# Patient Record
Sex: FEMALE | Race: BLACK OR AFRICAN AMERICAN | NOT HISPANIC OR LATINO | URBAN - METROPOLITAN AREA
[De-identification: names, ages, dates, MRNs, and addresses within clinical notes are randomized per-mention and may not be internally consistent; named-entity substitution may affect disease eponyms.]

---

## 2024-02-26 ENCOUNTER — APPOINTMENT (OUTPATIENT)
Dept: PRIMARY CARE | Facility: EXTERNAL LOCATION | Age: 39
End: 2024-02-26

## 2024-03-01 ENCOUNTER — OFFICE VISIT (OUTPATIENT)
Dept: PRIMARY CARE | Facility: EXTERNAL LOCATION | Age: 39
End: 2024-03-01

## 2024-03-01 VITALS
TEMPERATURE: 97.8 F | DIASTOLIC BLOOD PRESSURE: 82 MMHG | BODY MASS INDEX: 48.82 KG/M2 | HEIGHT: 65 IN | RESPIRATION RATE: 17 BRPM | OXYGEN SATURATION: 98 % | HEART RATE: 78 BPM | SYSTOLIC BLOOD PRESSURE: 120 MMHG | WEIGHT: 293 LBS

## 2024-03-01 DIAGNOSIS — Z01.84 IMMUNITY STATUS TESTING: ICD-10-CM

## 2024-03-01 DIAGNOSIS — Z11.1 TUBERCULOSIS SCREENING: ICD-10-CM

## 2024-03-01 DIAGNOSIS — Z02.0 SCHOOL HEALTH EXAMINATION: Primary | ICD-10-CM

## 2024-03-01 PROBLEM — I10 HYPERTENSION: Status: ACTIVE | Noted: 2024-03-01

## 2024-03-01 PROCEDURE — 86706 HEP B SURFACE ANTIBODY: CPT | Performed by: NURSE PRACTITIONER

## 2024-03-01 PROCEDURE — 86787 VARICELLA-ZOSTER ANTIBODY: CPT | Performed by: NURSE PRACTITIONER

## 2024-03-01 PROCEDURE — 86735 MUMPS ANTIBODY: CPT | Performed by: NURSE PRACTITIONER

## 2024-03-01 PROCEDURE — 86317 IMMUNOASSAY INFECTIOUS AGENT: CPT | Performed by: NURSE PRACTITIONER

## 2024-03-01 PROCEDURE — 86765 RUBEOLA ANTIBODY: CPT | Performed by: NURSE PRACTITIONER

## 2024-03-01 RX ORDER — AMLODIPINE BESYLATE 10 MG/1
10 TABLET ORAL DAILY
COMMUNITY
Start: 2024-02-02

## 2024-03-01 RX ORDER — LOSARTAN POTASSIUM AND HYDROCHLOROTHIAZIDE 12.5; 1 MG/1; MG/1
1 TABLET ORAL DAILY
COMMUNITY
Start: 2024-02-02

## 2024-03-01 ASSESSMENT — ENCOUNTER SYMPTOMS
CHILLS: 0
EYE DISCHARGE: 0
WEAKNESS: 0
NUMBNESS: 0
HEADACHES: 0
BACK PAIN: 0
DIZZINESS: 0
STRIDOR: 0
RHINORRHEA: 0
POLYPHAGIA: 0
SEIZURES: 0
COLOR CHANGE: 0
NAUSEA: 0
CONFUSION: 0
PHOTOPHOBIA: 0
LIGHT-HEADEDNESS: 0
EYE REDNESS: 0
ABDOMINAL PAIN: 0
DIAPHORESIS: 0
EYE ITCHING: 0
DIFFICULTY URINATING: 0
MYALGIAS: 0
ARTHRALGIAS: 0
ACTIVITY CHANGE: 0
SHORTNESS OF BREATH: 0
APPETITE CHANGE: 0
NECK STIFFNESS: 0
SPEECH DIFFICULTY: 0
UNEXPECTED WEIGHT CHANGE: 0
FATIGUE: 0
EYE PAIN: 0
DIARRHEA: 0
WHEEZING: 0
VOMITING: 0
PALPITATIONS: 0
ABDOMINAL DISTENTION: 0
COUGH: 0
VOICE CHANGE: 0
POLYDIPSIA: 0
FEVER: 0
CHEST TIGHTNESS: 0
DECREASED CONCENTRATION: 0
CONSTIPATION: 0
TROUBLE SWALLOWING: 0
NECK PAIN: 0
JOINT SWELLING: 0
TREMORS: 0

## 2024-03-01 NOTE — PROGRESS NOTES
Subjective   Patient ID: Robin Bergeron is a 38 y.o. 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    HPI:  Here for nursing school physical.     Denies chest pain, shortness of breath, musculoskeletal complaints, neuro complaints, vision changes or any symptoms that would interfere with ability to participate in nursing school, nursing clinicals or provide patient care. Has no symptoms/complaints at this time.      Patient has never been restricted/declined participation in activities from a medical standpoint.      No family history of sudden cardiac death at early age.     No Known Allergies      Current Outpatient Medications on File Prior to Visit   Medication Sig    amLODIPine (Norvasc) 10 mg tablet Take 1 tablet (10 mg) by mouth once daily.    losartan-hydrochlorothiazide (Hyzaar) 100-12.5 mg tablet Take 1 tablet by mouth once daily.     No current facility-administered medications on file prior to visit.          Past Medical History:   Diagnosis Date    Hypertension     Spontaneous vaginal delivery     x 2       History reviewed. No pertinent surgical history.    Family History   Problem Relation Name Age of Onset    Diabetes Mother      Hypertension Mother      Diabetes Father      Hypertension Father      Diabetes Maternal Grandmother      Diabetes Paternal Grandmother          Review of Systems   Constitutional:  Negative for activity change, appetite change, chills, diaphoresis, fatigue, fever and unexpected weight change.   HENT:  Negative for congestion, dental problem, hearing loss, rhinorrhea, tinnitus, trouble swallowing and voice change.    Eyes:  Negative for photophobia, pain, discharge, redness, itching and visual disturbance.   Respiratory:  Negative for cough, chest tightness, shortness of breath, wheezing and stridor.    Cardiovascular:  Negative for chest pain, palpitations and leg swelling.   Gastrointestinal:  Negative for abdominal distention, abdominal pain,  "constipation, diarrhea, nausea and vomiting.   Endocrine: Negative for cold intolerance, heat intolerance, polydipsia, polyphagia and polyuria.   Genitourinary:  Negative for difficulty urinating.   Musculoskeletal:  Negative for arthralgias, back pain, gait problem, joint swelling, myalgias, neck pain and neck stiffness.   Skin:  Negative for color change and rash.   Neurological:  Negative for dizziness, tremors, seizures, syncope, speech difficulty, weakness, light-headedness, numbness and headaches.   Psychiatric/Behavioral:  Negative for behavioral problems, confusion and decreased concentration.        Objective     Visit Vitals  /82   Pulse 78   Temp 36.6 °C (97.8 °F)   Resp 17   Ht 1.651 m (5' 5\")   Wt 138 kg (303 lb 3.2 oz)   LMP 02/23/2024   SpO2 98%   BMI 50.46 kg/m²   OB Status Having periods   Smoking Status Every Day   BSA 2.52 m²       Vision Screening    Right eye Left eye Both eyes   Without correction 20/20 20/20 20/20   With correction           Physical Exam  Constitutional:       General: She is not in acute distress.     Appearance: Normal appearance. She is not ill-appearing.   HENT:      Head: Normocephalic and atraumatic.      Right Ear: Tympanic membrane, ear canal and external ear normal.      Left Ear: Tympanic membrane, ear canal and external ear normal.      Nose: Nose normal.      Mouth/Throat:      Mouth: Mucous membranes are moist.      Pharynx: Oropharynx is clear. No oropharyngeal exudate or posterior oropharyngeal erythema.   Eyes:      Extraocular Movements: Extraocular movements intact.      Conjunctiva/sclera: Conjunctivae normal.      Pupils: Pupils are equal, round, and reactive to light.   Neck:      Comments: Mild dowagers  Cardiovascular:      Rate and Rhythm: Normal rate and regular rhythm.      Pulses: Normal pulses.      Heart sounds: Normal heart sounds. No murmur heard.  Pulmonary:      Effort: Pulmonary effort is normal. No respiratory distress.      Breath " sounds: Normal breath sounds. No wheezing, rhonchi or rales.   Abdominal:      General: Abdomen is flat. Bowel sounds are normal. There is no distension.      Palpations: Abdomen is soft.      Tenderness: There is no abdominal tenderness. There is no guarding.   Musculoskeletal:         General: No swelling, tenderness, deformity or signs of injury. Normal range of motion.      Right upper arm: Normal.      Left upper arm: Normal.      Right wrist: Normal.      Left wrist: Normal.      Right hand: Normal.      Left hand: Normal.      Cervical back: Normal, normal range of motion and neck supple. No deformity, rigidity or bony tenderness. Normal range of motion.      Thoracic back: Normal. No deformity or bony tenderness. Normal range of motion.      Lumbar back: Normal. No deformity or bony tenderness. Normal range of motion.      Right hip: Normal.      Left hip: Normal.      Right upper leg: Normal.      Left upper leg: Normal.      Right knee: Normal.      Left knee: Normal.      Right lower leg: Normal. No deformity. No edema.      Left lower leg: Normal. No deformity. No edema.      Comments: Strength normal and equal in upper and lower extremities SARA   Skin:     General: Skin is warm.      Capillary Refill: Capillary refill takes less than 2 seconds.   Neurological:      General: No focal deficit present.      Mental Status: She is alert. Mental status is at baseline.      Cranial Nerves: No cranial nerve deficit.      Sensory: No sensory deficit.      Motor: No weakness.      Coordination: Coordination normal.      Gait: Gait normal.      Deep Tendon Reflexes: Reflexes normal.   Psychiatric:         Mood and Affect: Mood normal.         Behavior: Behavior normal.         Thought Content: Thought content normal.           Assessment/Plan   Diagnoses and all orders for this visit:  School health examination  -     Hepatitis B Surface Antibody  -     Mumps Antibody, IgG  -     Rubella Antibody, IgG  -      Rubeola Antibody, IgG  -     Varicella Zoster Antibody, IgG  Immunity status testing  -     Hepatitis B Surface Antibody  -     Mumps Antibody, IgG  -     Rubella Antibody, IgG  -     Rubeola Antibody, IgG  -     Varicella Zoster Antibody, IgG    - Will need TDAP, COVID and influenza records- per school forms.   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   - Titers drawn.   - TB placed.   - Follow up with PCP for routine medical exams, preventative screenings and with development of any symptoms.

## 2024-03-02 LAB
HBV SURFACE AB SER-ACNC: 558.4 MIU/ML
MEV IGG SER QL IA: POSITIVE
MUMPS IGG ANTIBODY INDEX: 1.4 IA
MUV IGG SER IA-ACNC: POSITIVE
RUBEOLA IGG ANTIBODY INDEX: 1.3 IA
RUBV IGG SERPL IA-ACNC: 2.2 IA
RUBV IGG SERPL QL IA: POSITIVE
VARICELLA ZOSTER IGG INDEX: 4 IA
VZV IGG SER QL IA: POSITIVE

## 2024-03-04 LAB
INDURATION: 0 MM
TB SKIN TEST: NEGATIVE

## 2024-03-18 ENCOUNTER — OFFICE VISIT (OUTPATIENT)
Dept: PRIMARY CARE | Facility: EXTERNAL LOCATION | Age: 39
End: 2024-03-18

## 2024-03-18 DIAGNOSIS — Z11.1 SCREENING-PULMONARY TB: Primary | ICD-10-CM

## 2024-03-18 NOTE — PROGRESS NOTES
PPD Placement note  Robin Bergeron, 38 y.o. 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 Yes  IO: Alert and oriented in NAD.  P:  PPD placed on 3/18/2024.  Patient advised to return for reading within 48-72 hours.

## 2024-03-20 LAB
INDURATION: 0 MM
TB SKIN TEST: NEGATIVE

## 2025-02-05 ENCOUNTER — APPOINTMENT (OUTPATIENT)
Dept: PRIMARY CARE | Facility: EXTERNAL LOCATION | Age: 40
End: 2025-02-05

## 2025-02-05 VITALS
BODY MASS INDEX: 47.09 KG/M2 | WEIGHT: 293 LBS | DIASTOLIC BLOOD PRESSURE: 83 MMHG | HEIGHT: 66 IN | SYSTOLIC BLOOD PRESSURE: 130 MMHG | OXYGEN SATURATION: 98 % | RESPIRATION RATE: 17 BRPM | HEART RATE: 61 BPM | TEMPERATURE: 98.1 F

## 2025-02-05 DIAGNOSIS — Z02.0 SCHOOL HEALTH EXAMINATION: Primary | ICD-10-CM

## 2025-02-05 DIAGNOSIS — Z11.1 SCREENING-PULMONARY TB: ICD-10-CM

## 2025-02-05 ASSESSMENT — ENCOUNTER SYMPTOMS
NECK STIFFNESS: 0
HEMATURIA: 0
EYE DISCHARGE: 0
FREQUENCY: 0
BACK PAIN: 0
DIAPHORESIS: 0
NUMBNESS: 0
ACTIVITY CHANGE: 0
TROUBLE SWALLOWING: 0
EYE PAIN: 0
NAUSEA: 0
FLANK PAIN: 0
DIFFICULTY URINATING: 0
CONFUSION: 0
POLYDIPSIA: 0
POLYPHAGIA: 0
UNEXPECTED WEIGHT CHANGE: 0
CHILLS: 0
WEAKNESS: 0
DECREASED CONCENTRATION: 0
ABDOMINAL PAIN: 0
APPETITE CHANGE: 0
RHINORRHEA: 0
STRIDOR: 0
PHOTOPHOBIA: 0
WHEEZING: 0
HALLUCINATIONS: 0
DIZZINESS: 0
COUGH: 0
ARTHRALGIAS: 0
DIARRHEA: 0
FATIGUE: 0
LIGHT-HEADEDNESS: 0
MYALGIAS: 0
SHORTNESS OF BREATH: 0
SPEECH DIFFICULTY: 0
SEIZURES: 0
HEADACHES: 0
CHEST TIGHTNESS: 0
DYSURIA: 0
FEVER: 0
ABDOMINAL DISTENTION: 0
NECK PAIN: 0
CONSTIPATION: 0
VOMITING: 0
PALPITATIONS: 0
EYE REDNESS: 0
COLOR CHANGE: 0
TREMORS: 0
JOINT SWELLING: 0
VOICE CHANGE: 0
EYE ITCHING: 0

## 2025-02-05 NOTE — PROGRESS NOTES
Subjective   Patient ID: Robin Bergeron is a 39 y.o. 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    HPI:  Here for physical. Needs TB. Up to date on titers.     Denies chest pain, shortness of breath, musculoskeletal complaints, neuro complaints, vision changes or any symptoms that would interfere with ability to participate in nursing school, nursing clinicals or provide patient care. Has no symptoms/complaints at this time.      Patient  reports has never been restricted/declined participation in activities from a medical standpoint.      Denies family history of sudden cardiac death at an early age.   Denies family history of congenital heart defects.      No Known Allergies      Current Outpatient Medications   Medication Sig Dispense Refill   • amLODIPine (Norvasc) 10 mg tablet Take 1 tablet (10 mg) by mouth once daily.     • losartan-hydrochlorothiazide (Hyzaar) 100-12.5 mg tablet Take 1 tablet by mouth once daily.       No current facility-administered medications for this visit.          Past Medical History:   Diagnosis Date   • Hypertension    • Spontaneous vaginal delivery ([facility])     x 2       History reviewed. No pertinent surgical history.    Family History   Problem Relation Name Age of Onset   • Diabetes Mother     • Hypertension Mother     • Diabetes Father     • Hypertension Father     • Diabetes Maternal Grandmother     • Diabetes Paternal Grandmother          Review of Systems   Constitutional:  Negative for activity change, appetite change, chills, diaphoresis, fatigue, fever and unexpected weight change.   HENT:  Negative for congestion, dental problem, hearing loss, rhinorrhea, tinnitus, trouble swallowing and voice change.    Eyes:  Negative for photophobia, pain, discharge, redness, itching and visual disturbance.   Respiratory:  Negative for cough, chest tightness, shortness of breath, wheezing and stridor.    Cardiovascular:  Negative for chest pain,  "palpitations and leg swelling.   Gastrointestinal:  Negative for abdominal distention, abdominal pain, constipation, diarrhea, nausea and vomiting.   Endocrine: Negative for cold intolerance, heat intolerance, polydipsia, polyphagia and polyuria.   Genitourinary:  Negative for difficulty urinating, dysuria, flank pain, frequency, hematuria and urgency.   Musculoskeletal:  Negative for arthralgias, back pain, gait problem, joint swelling, myalgias, neck pain and neck stiffness.   Skin:  Negative for color change and rash.   Neurological:  Negative for dizziness, tremors, seizures, syncope, speech difficulty, weakness, light-headedness, numbness and headaches.   Psychiatric/Behavioral:  Negative for behavioral problems, confusion, decreased concentration, hallucinations, self-injury and suicidal ideas.        Objective     Visit Vitals  /83   Pulse 61   Temp 36.7 °C (98.1 °F) (Oral)   Resp 17   Ht 1.676 m (5' 6\")   Wt 139 kg (307 lb)   LMP 01/27/2025   SpO2 98%   BMI 49.55 kg/m²   OB Status Having periods   Smoking Status Some Days   BSA 2.54 m²       Vision Screening    Right eye Left eye Both eyes   Without correction 20/20 20/20 20/15   With correction           Physical Exam  Constitutional:       General: She is not in acute distress.     Appearance: Normal appearance. She is not ill-appearing.   HENT:      Head: Normocephalic and atraumatic.      Right Ear: Tympanic membrane, ear canal and external ear normal.      Left Ear: Tympanic membrane, ear canal and external ear normal.      Nose: Nose normal.      Mouth/Throat:      Mouth: Mucous membranes are moist.      Pharynx: Oropharynx is clear. No oropharyngeal exudate or posterior oropharyngeal erythema.   Eyes:      Extraocular Movements: Extraocular movements intact.      Conjunctiva/sclera: Conjunctivae normal.      Pupils: Pupils are equal, round, and reactive to light.   Neck:      Comments: Kyphosis noted  Cardiovascular:      Rate and Rhythm: Normal " rate and regular rhythm.      Pulses: Normal pulses.      Heart sounds: Normal heart sounds. No murmur heard.  Pulmonary:      Effort: Pulmonary effort is normal. No respiratory distress.      Breath sounds: Normal breath sounds. No wheezing, rhonchi or rales.   Abdominal:      General: Abdomen is flat. Bowel sounds are normal. There is no distension.      Palpations: Abdomen is soft.      Tenderness: There is no abdominal tenderness. There is no guarding.   Genitourinary:     Comments: deferred  Musculoskeletal:         General: No swelling, tenderness, deformity or signs of injury. Normal range of motion.      Right upper arm: Normal.      Left upper arm: Normal.      Right wrist: Normal.      Left wrist: Normal.      Right hand: Normal.      Left hand: Normal.      Cervical back: Normal, normal range of motion and neck supple. No deformity, rigidity or bony tenderness. Normal range of motion.      Thoracic back: Normal. No deformity or bony tenderness. Normal range of motion.      Lumbar back: Normal. No deformity or bony tenderness. Normal range of motion.      Right hip: Normal.      Left hip: Normal.      Right upper leg: Normal.      Left upper leg: Normal.      Right knee: Normal.      Left knee: Normal.      Right lower leg: Normal. No deformity. No edema.      Left lower leg: Normal. No deformity. No edema.      Comments: Strength normal and equal in upper and lower extremities SARA   Skin:     General: Skin is warm.      Capillary Refill: Capillary refill takes less than 2 seconds.   Neurological:      General: No focal deficit present.      Mental Status: She is alert. Mental status is at baseline.      Cranial Nerves: No cranial nerve deficit.      Sensory: No sensory deficit.      Motor: No weakness.      Coordination: Coordination normal.      Gait: Gait normal.      Deep Tendon Reflexes: Reflexes normal.   Psychiatric:         Mood and Affect: Mood normal.         Behavior: Behavior normal.          Thought Content: Thought content normal.         Assessment/Plan   Diagnoses and all orders for this visit:  School health examination  -     TB Skin Test  Screening-pulmonary TB  -     TB Skin Test    - Place TB. Patient to return in 48-72 hours for read.   - Cleared for nursing school physical.   - Encouraged smoking cessation.   - Recommend routine medical exams and preventative screenings with PCP  - Follow up with PCP with development of any symptoms.

## 2025-02-07 LAB
INDURATION: 0 MM
TB SKIN TEST: NEGATIVE